# Patient Record
Sex: FEMALE | Race: WHITE | URBAN - METROPOLITAN AREA
[De-identification: names, ages, dates, MRNs, and addresses within clinical notes are randomized per-mention and may not be internally consistent; named-entity substitution may affect disease eponyms.]

---

## 2017-04-03 ENCOUNTER — TRANSFERRED RECORDS (OUTPATIENT)
Dept: HEALTH INFORMATION MANAGEMENT | Facility: CLINIC | Age: 57
End: 2017-04-03

## 2017-07-17 ENCOUNTER — OFFICE VISIT (OUTPATIENT)
Dept: OPHTHALMOLOGY | Facility: CLINIC | Age: 57
End: 2017-07-17
Attending: OPHTHALMOLOGY
Payer: COMMERCIAL

## 2017-07-17 DIAGNOSIS — E07.9 THYROID EYE DISEASE: Primary | ICD-10-CM

## 2017-07-17 DIAGNOSIS — H57.89 THYROID EYE DISEASE: Primary | ICD-10-CM

## 2017-07-17 PROCEDURE — 99213 OFFICE O/P EST LOW 20 MIN: CPT | Mod: ZF | Performed by: TECHNICIAN/TECHNOLOGIST

## 2017-07-17 PROCEDURE — 92285 EXTERNAL OCULAR PHOTOGRAPHY: CPT | Mod: ZF | Performed by: OPHTHALMOLOGY

## 2017-07-17 ASSESSMENT — REFRACTION_MANIFEST
OD_SPHERE: -1.25
OD_CYLINDER: SPHERE

## 2017-07-17 ASSESSMENT — VISUAL ACUITY
OS_SC: 20/20
OD_SC+: -1
METHOD: SNELLEN - LINEAR
OD_SC: 20/70

## 2017-07-17 ASSESSMENT — TONOMETRY
OD_IOP_MMHG: 12
OS_IOP_MMHG: 11
OS_IOP_MMHG: 12
OD_IOP_MMHG: 13

## 2017-07-17 ASSESSMENT — SLIT LAMP EXAM - LIDS
COMMENTS: NORMAL
COMMENTS: NORMAL

## 2017-07-17 ASSESSMENT — CONF VISUAL FIELD
OD_NORMAL: 1
OS_NORMAL: 1

## 2017-07-17 NOTE — Clinical Note
7/17/2017      RE: Pooja Garcia  [address] [facility] [address]              Assessment & Plan     Pooja Garcia is a 57 year old female with the following diagnoses:   1. Thyroid eye disease      58 yo old female with recent diagnosis in January 2017, non-smoker withThyroid eye disease (LORENA).  The natural history of thyroid eye disease was discussed. We told the patient that typically LORENA will worsen for a period of time, then improve to some degree, and then stabilize without normalizing.  This process can take somewhere between 1 and 3 years on average.  In the meantime, we recommended seeing the patient in the [facility] every 6 months (sooner if the patient experiences worsening vision in either eye).  Once the patient becomes stable for at least 6 months' time, we discussed that the patient may need restorative surgery or prisms.  Finally, we discussed that correcting the thyroid hormone levels does not ensure that the eyes will normalize but that it could help to some degree.       Currenlty on Methimazole. SUMIT 0/7. Signficant lid retraction and lid lad in the right. No lagophthalmos, good Arellano's phenomenon. Low risk of corneal exposure. Recommend ATs prn and bedtime lubrication. Follow up in 4-6 months. We discussed selenium.             Attending Physician Attestation:  Complete documentation of historical and exam elements from today's encounter can be found in the full encounter summary report (not reduplicated in this progress note).  I personally obtained the chief complaint(s) and history of present illness.  I confirmed and edited as necessary the review of systems, past medical/surgical history, family history, social history, and examination findings as documented by others; and I examined the patient myself.  I personally reviewed the relevant tests, images, and reports as documented above.  I formulated and edited as necessary the assessment and plan and discussed  the findings and management plan with the patient and family. - Juan Luis Bolden MD

## 2017-07-17 NOTE — LETTER
2017         RE:  :  MRN: Pooja Garcia  1960  [id number]     Dear Dr. Gee,    Thank you for asking me to see your very pleasant patient, Pooja Garcia, in neuro-ophthalmic consultation.  I would like to thank you for sending your records and I have summarized them in the history of present illness.   My assessment and plan are below.  For further details, please see my attached clinic note.          Assessment & Plan     Pooja Garcia is a 57 year old female with the following diagnoses:   1. Thyroid eye disease      56 yo old female with recent diagnosis in 2017, non-smoker withThyroid eye disease (LORENA).  The natural history of thyroid eye disease was discussed. We told the patient that typically LORENA will worsen for a period of time, then improve to some degree, and then stabilize without normalizing.  This process can take somewhere between 1 and 3 years on average.  In the meantime, we recommended seeing the patient in the [facility] every 6 months (sooner if the patient experiences worsening vision in either eye).  Once the patient becomes stable for at least 6 months' time, we discussed that the patient may need restorative surgery or prisms.  Finally, we discussed that correcting the thyroid hormone levels does not ensure that the eyes will normalize but that it could help to some degree.       Currenlty on Methimazole. SUMIT 0/7. Signficant lid retraction and lid lad in the right. No lagophthalmos, good Arellano's phenomenon. Low risk of corneal exposure. Recommend ATs prn and bedtime lubrication. Follow up in 4-6 months. We discussed selenium.            Again, thank you for allowing me to participate in the care of your patient.      Sincerely,    Juan Luis Bolden MD  Professor, Neuro-Ophthalmology  Department of Ophthalmology and Visual Neurosciences  [facility]    CC:  Melita Gee, ELIANA  [facility]  [address] [place] [address], [address]  [place]  [address]  VIA Facsimile:  [phone]     Navdeep Hilton PA-C  [facility]  [address] [facility] [address]  VIA Facsimile: [phone]     Yanick Alejandre MD   Physicians  [address] [facility] [address]  [facility] [address]  VIA In Basket

## 2017-07-17 NOTE — MR AVS SNAPSHOT
After Visit Summary   2017    Pooja Garcia    MRN: [id number]           Patient Information     Date Of Birth          1960        Visit Information        Provider Department      2017 1:30 PM Juan Luis Bolden MD [facility]        Today's Diagnoses     Thyroid eye disease    -  1       Follow-ups after your visit        Your next 10 appointments already scheduled     2018  2:45 PM CST   RETURN THYROID EYE with Juan Luis Bolden MD   [facility] Eye General ([facility])    [address]  [address] [address] [phone]              Who to contact     Please call your clinic at [phone] to:    Ask questions about your health    Make or cancel appointments    Discuss your medicines    Learn about your test results    Speak to your doctor   If you have compliments or concerns about an experience at your clinic, or if you wish to file a complaint, please contact [facility] Patient Relations at [phone] or email us at [email or web address]         Additional Information About Your Visit        MyChart Information     OpenPeak is an electronic gateway that provides easy, online access to your medical records. With OpenPeak, you can request a clinic appointment, read your test results, renew a prescription or communicate with your care team.     To sign up for OpenPeak visit the website at [email or web address]   You will be asked to enter the access code listed below, as well as some personal information. Please follow the directions to create your username and password.     Your access code is: [email or web address]  Expires: 2017 10:36 AM     Your access code will  in 90 days. If you need help or a new code, please contact your [facility] or call [phone] for assistance.        Care EveryWhere ID     This is your Care EveryWhere ID.  This could be used by other organizations to access your [facility] medical records  [id number]         Blood Pressure from Last 3 Encounters:   No data found for BP    Weight from Last 3 Encounters:   No data found for Wt              We Performed the Following     External Photos Thyroid Series        Primary Care Provider Office Phone # Fax #    Navdeep Hilton [phone]       [place] [address]        Equal Access to Services     ROSEMARIE BOLAÑOS : Hadii aad ku hadasho Soomaali, waaxda luqadaha, qaybta kaalmada adeegyada, waxay idiin hayaan adeeg kharash la'aan ah. So [facility] [phone].    ATENCIÓN: Si habla español, tiene a garrett disposición servicios gratuitos de asistencia lingüística. Llame al [phone].    We comply with applicable federal civil rights laws and [place] laws. We do not discriminate on the basis of race, color, national origin, age, disability sex, sexual orientation or gender identity.            Thank you!     Thank you for choosing [facility]  for your care. Our goal is always to provide you with excellent care. Hearing back from our patients is one way we can continue to improve our services. Please take a few minutes to complete the written survey that you may receive in the mail after your visit with us. Thank you!             Your Updated Medication List - Protect others around you: Learn how to safely use, store and throw away your medicines at www.disposemymeds.org.          This list is accurate as of: 7/17/17  2:43 PM.  Always use your most recent med list.                   Brand Name Dispense Instructions for use Diagnosis    METHIMAZOLE PO

## 2017-07-17 NOTE — NURSING NOTE
Chief Complaint   Patient presents with     Thyroid Disease     Graves disease dx Jan 2017,+ changes to RE only, RE feels dry and wet at same time, no redness, appears RUL retracted, no VA changes, no strab, no diplopia, no drops/ointment currently      HPI    Affected eye(s):  Both   Symptoms: